# Patient Record
Sex: FEMALE | Race: WHITE | Employment: OTHER | URBAN - METROPOLITAN AREA
[De-identification: names, ages, dates, MRNs, and addresses within clinical notes are randomized per-mention and may not be internally consistent; named-entity substitution may affect disease eponyms.]

---

## 2024-07-10 ENCOUNTER — APPOINTMENT (OUTPATIENT)
Dept: CT IMAGING | Age: 89
End: 2024-07-10
Attending: EMERGENCY MEDICINE
Payer: MEDICARE

## 2024-07-10 ENCOUNTER — HOSPITAL ENCOUNTER (EMERGENCY)
Age: 89
Discharge: HOME | End: 2024-07-10
Attending: EMERGENCY MEDICINE
Payer: MEDICARE

## 2024-07-10 VITALS
OXYGEN SATURATION: 99 % | RESPIRATION RATE: 16 BRPM | DIASTOLIC BLOOD PRESSURE: 83 MMHG | TEMPERATURE: 97.4 F | SYSTOLIC BLOOD PRESSURE: 185 MMHG | HEART RATE: 98 BPM

## 2024-07-10 DIAGNOSIS — S09.90XA CLOSED HEAD INJURY, INITIAL ENCOUNTER: Primary | ICD-10-CM

## 2024-07-10 DIAGNOSIS — S02.2XXA CLOSED FRACTURE OF NASAL BONE, INITIAL ENCOUNTER: ICD-10-CM

## 2024-07-10 PROCEDURE — 70450 CT HEAD/BRAIN W/O DYE: CPT

## 2024-07-10 PROCEDURE — 70486 CT MAXILLOFACIAL W/O DYE: CPT

## 2024-07-10 PROCEDURE — 72125 CT NECK SPINE W/O DYE: CPT

## 2024-07-10 PROCEDURE — 99284 EMERGENCY DEPT VISIT MOD MDM: CPT

## 2024-07-10 RX ORDER — OXYMETAZOLINE HYDROCHLORIDE 0.05 G/100ML
2 SPRAY NASAL 2 TIMES DAILY PRN
Qty: 2 ML | Refills: 0 | Status: SHIPPED | OUTPATIENT
Start: 2024-07-10 | End: 2024-07-13

## 2024-07-10 RX ORDER — ECHINACEA PURPUREA EXTRACT 125 MG
1 TABLET ORAL PRN
Qty: 1 EACH | Refills: 3 | Status: SHIPPED | OUTPATIENT
Start: 2024-07-10

## 2024-07-10 NOTE — ED PROVIDER NOTES
ED PROVIDER NOTE    Chief Complaint   Patient presents with    Fall     Bed-floor + head injury (R orbital/forehead bruising noted) skin tear to L arm. -thinners.        HPI:  7/10/24,   Time: 5:21 PM EDT       Mickie Castaneda is a 89 y.o. female presenting to the ED for fall and head injury.  Acute onset shortly prior to arrival.  Patient states that she woke up, sat up in bed, and felt sudden onset of vertigo.  She has a prior history of intermittent vertigo.  Denies any dizziness right now.  She fell forward after standing up from bed and struck the right side of her face on a wall.  Not on anticoagulants.  No vision changes currently.  Denies associated head, neck, back, chest, abdominal pain.  No nausea or vomiting.    Chart review: hx of vertigo      Review of Systems:     Review of Systems  Pertinent positives and negatives as stated in HPI     --------------------------------------------- PAST HISTORY ---------------------------------------------  Past Medical History:   No past medical history on file.    Past Surgical History:   No past surgical history on file.    Social History:   Social History     Socioeconomic History    Marital status:        Family History:   No family history on file.    The patient’s home medications have been reviewed.    Allergies:   Allergies   Allergen Reactions    Robaxin [Methocarbamol]            ---------------------------------------------------PHYSICAL EXAM--------------------------------------    BP (!) 191/95   Pulse 98   Temp 97.4 °F (36.3 °C)   Resp 18   SpO2 99%     Physical Exam  Vitals and nursing note reviewed.   Constitutional:       General: She is not in acute distress.     Appearance: She is not toxic-appearing.   HENT:      Head:      Comments: Right periorbital ecchymosis     Mouth/Throat:      Mouth: Mucous membranes are moist.   Eyes:      General: No scleral icterus.     Extraocular Movements: Extraocular movements intact.      Pupils:

## 2024-07-10 NOTE — DISCHARGE INSTRUCTIONS
Return to the ER if you have worsening headache, vomiting, unable to keep down food or drink, difficulty walking, talking, or seeing, stiffness in your neck, or any other new or concerning symptoms.    No nose blowing.